# Patient Record
Sex: MALE | URBAN - METROPOLITAN AREA
[De-identification: names, ages, dates, MRNs, and addresses within clinical notes are randomized per-mention and may not be internally consistent; named-entity substitution may affect disease eponyms.]

---

## 2023-01-10 ENCOUNTER — OFFICE VISIT (OUTPATIENT)
Dept: URBAN - METROPOLITAN AREA CLINIC 10 | Facility: CLINIC | Age: 38
End: 2023-01-10
Payer: COMMERCIAL

## 2023-01-10 DIAGNOSIS — H35.713 CENTRAL SEROUS CHORIORETINOPATHY, BILATERAL: Primary | ICD-10-CM

## 2023-01-10 PROCEDURE — 92134 CPTRZ OPH DX IMG PST SGM RTA: CPT | Performed by: STUDENT IN AN ORGANIZED HEALTH CARE EDUCATION/TRAINING PROGRAM

## 2023-01-10 PROCEDURE — 99204 OFFICE O/P NEW MOD 45 MIN: CPT | Performed by: STUDENT IN AN ORGANIZED HEALTH CARE EDUCATION/TRAINING PROGRAM

## 2023-01-10 ASSESSMENT — INTRAOCULAR PRESSURE
OS: 18
OD: 18

## 2023-01-10 ASSESSMENT — VISUAL ACUITY
OD: 20/30
OS: 20/60

## 2023-01-10 ASSESSMENT — KERATOMETRY
OD: 44.25
OS: 44.25

## 2023-01-10 NOTE — IMPRESSION/PLAN
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  Acute onset OD ~Dec.

RTC 2mo short with DFE, if no improvement consider retina eval